# Patient Record
Sex: FEMALE | Race: WHITE | NOT HISPANIC OR LATINO
[De-identification: names, ages, dates, MRNs, and addresses within clinical notes are randomized per-mention and may not be internally consistent; named-entity substitution may affect disease eponyms.]

---

## 2022-02-10 ENCOUNTER — DASHBOARD ENCOUNTERS (OUTPATIENT)
Age: 57
End: 2022-02-10

## 2022-02-10 ENCOUNTER — OFFICE VISIT (OUTPATIENT)
Dept: URBAN - METROPOLITAN AREA CLINIC 12 | Facility: CLINIC | Age: 57
End: 2022-02-10
Payer: COMMERCIAL

## 2022-02-10 VITALS
BODY MASS INDEX: 36.99 KG/M2 | WEIGHT: 188.4 LBS | TEMPERATURE: 98 F | DIASTOLIC BLOOD PRESSURE: 89 MMHG | HEART RATE: 99 BPM | SYSTOLIC BLOOD PRESSURE: 163 MMHG | HEIGHT: 60 IN

## 2022-02-10 DIAGNOSIS — K76.0 FATTY LIVER: ICD-10-CM

## 2022-02-10 DIAGNOSIS — Z90.710 S/P HYSTERECTOMY: ICD-10-CM

## 2022-02-10 DIAGNOSIS — R79.89 ABNORMAL LFTS: ICD-10-CM

## 2022-02-10 DIAGNOSIS — N81.10 FEMALE BLADDER PROLAPSE: ICD-10-CM

## 2022-02-10 PROCEDURE — 99204 OFFICE O/P NEW MOD 45 MIN: CPT | Performed by: INTERNAL MEDICINE

## 2022-02-10 NOTE — HPI-TODAY'S VISIT:
56-year-old female presented today for evaluation of fatty liver hepatomegaly and elevated liver enzyme.  She was diagnosed with renal cell cancer in 2000 she had left nephrectomy she had history of hysterectomy bladder prolapse require surgery she only complaint of recurrent UTI require multiple antibiotics she had occasionally urine incontinence associated with the symptoms.  Only GI complaint is bloating after she eats.  She gained weight recently she had CT scan at her primary care showed hepatomegaly with fatty liver.  There is edema in the suprapubic area postsurgical changes, she had surgery for bladder prolapse in the past since then she has multiple episode of UTI require long-term antibiotic she has urinary incontinence mostly when she sneezes or cough Her most recent lab feels normal platelet she had only mildly elevated ALT 55 AST 39 she denies alcohol use

## 2022-02-13 PROBLEM — 252005008: Status: ACTIVE | Noted: 2022-02-10

## 2022-02-13 PROBLEM — 197321007: Status: ACTIVE | Noted: 2022-02-10

## 2022-02-13 PROBLEM — 161800001 HISTORY OF HYSTERECTOMY: Status: ACTIVE | Noted: 2022-02-10
